# Patient Record
Sex: MALE | Race: WHITE | NOT HISPANIC OR LATINO | Employment: FULL TIME | URBAN - NONMETROPOLITAN AREA
[De-identification: names, ages, dates, MRNs, and addresses within clinical notes are randomized per-mention and may not be internally consistent; named-entity substitution may affect disease eponyms.]

---

## 2018-07-23 ENCOUNTER — OFFICE VISIT (OUTPATIENT)
Dept: URGENT CARE | Facility: PHYSICIAN GROUP | Age: 63
End: 2018-07-23
Payer: COMMERCIAL

## 2018-07-23 VITALS
HEART RATE: 74 BPM | SYSTOLIC BLOOD PRESSURE: 104 MMHG | WEIGHT: 182 LBS | RESPIRATION RATE: 16 BRPM | OXYGEN SATURATION: 99 % | TEMPERATURE: 98 F | DIASTOLIC BLOOD PRESSURE: 70 MMHG

## 2018-07-23 DIAGNOSIS — R10.813 RIGHT LOWER QUADRANT ABDOMINAL TENDERNESS, REBOUND TENDERNESS PRESENCE NOT SPECIFIED: ICD-10-CM

## 2018-07-23 DIAGNOSIS — R10.31 RLQ ABDOMINAL PAIN: ICD-10-CM

## 2018-07-23 PROCEDURE — 99203 OFFICE O/P NEW LOW 30 MIN: CPT | Performed by: EMERGENCY MEDICINE

## 2018-07-23 ASSESSMENT — ENCOUNTER SYMPTOMS
SHORTNESS OF BREATH: 0
VOMITING: 0
ANOREXIA: 1
FEVER: 1
HEMATOCHEZIA: 0
HEARTBURN: 0
COUGH: 0
BELCHING: 1
ABDOMINAL PAIN: 1
DIAPHORESIS: 0
NAUSEA: 0
CONSTIPATION: 1
BLOOD IN STOOL: 0
DIARRHEA: 1
CHILLS: 0

## 2018-07-23 NOTE — PROGRESS NOTES
Subjective:      Preston Hampton is a 63 y.o. male who presents with Abdominal Pain (x7d RLQ pain)            Abdominal Pain   This is a new problem. Episode onset: 8 days. The onset quality is sudden. The problem occurs daily. The problem has been gradually worsening. The pain is located in the RLQ. The quality of the pain is aching. The abdominal pain does not radiate. Associated symptoms include anorexia, belching, constipation, diarrhea and a fever. Pertinent negatives include no dysuria, frequency, hematochezia, hematuria, melena, nausea or vomiting. The pain is aggravated by movement, eating and certain positions. The pain is relieved by being still. He has tried antacids for the symptoms. The treatment provided no relief. There is no history of abdominal surgery or irritable bowel syndrome.   No trauma.    Review of Systems   Constitutional: Positive for fever. Negative for chills and diaphoresis.   Respiratory: Negative for cough and shortness of breath.    Cardiovascular: Negative for chest pain.   Gastrointestinal: Positive for abdominal pain, anorexia, constipation and diarrhea. Negative for blood in stool, heartburn, hematochezia, melena, nausea and vomiting.   Genitourinary: Negative for dysuria, frequency, hematuria and urgency.   Skin: Negative for rash.     PMH:  has no past medical history on file.  MEDS:   Current Outpatient Prescriptions:   •  levofloxacin (LEVAQUIN) 500 MG tablet, Take 1 Tab by mouth every day., Disp: 10 Tab, Rfl: 0  ALLERGIES:   Allergies   Allergen Reactions   • Pcn [Penicillins]      SURGHX: History reviewed. No pertinent surgical history.  SOCHX:  reports that he has never smoked. He has never used smokeless tobacco. He reports that he drinks alcohol.  FH: family history is not on file.       Objective:     /70   Pulse 74   Temp 36.7 °C (98 °F)   Resp 16   Wt 82.6 kg (182 lb)   SpO2 99%      Physical Exam   Constitutional: He is oriented to person, place, and  time. He appears well-developed and well-nourished. He is cooperative.  Non-toxic appearance. He does not have a sickly appearance. No distress.   HENT:   Mouth/Throat: Mucous membranes are normal.   Eyes: Conjunctivae and lids are normal. No scleral icterus.   Neck: Phonation normal. No JVD present.   Cardiovascular: Normal rate, regular rhythm and normal heart sounds.    No murmur heard.  Pulmonary/Chest: Effort normal and breath sounds normal.   Abdominal: Soft. He exhibits mass. He exhibits no distension. Bowel sounds are decreased. There is tenderness in the right lower quadrant. There is rebound and guarding. There is no rigidity and no CVA tenderness. No hernia.   Genitourinary: Testes normal and penis normal.   Lymphadenopathy:        Right: No inguinal adenopathy present.        Left: No inguinal adenopathy present.   Neurological: He is alert and oriented to person, place, and time.   Skin: Skin is warm and dry. No rash noted.   Psychiatric: He has a normal mood and affect.          Advised patient of need for ED evaluation and management; patient agreed. Patient appears stable enough transport by private vehicle. [facility] EDP provided telephone report.     Assessment/Plan:     1. Right lower quadrant abdominal tenderness, rebound tenderness presence not specified  NPO    2. RLQ abdominal pain

## 2021-09-28 ENCOUNTER — HOSPITAL ENCOUNTER (EMERGENCY)
Facility: MEDICAL CENTER | Age: 66
End: 2021-09-28
Attending: EMERGENCY MEDICINE
Payer: COMMERCIAL

## 2021-09-28 VITALS
TEMPERATURE: 98 F | RESPIRATION RATE: 17 BRPM | OXYGEN SATURATION: 98 % | HEIGHT: 72 IN | SYSTOLIC BLOOD PRESSURE: 130 MMHG | HEART RATE: 72 BPM | BODY MASS INDEX: 24.38 KG/M2 | DIASTOLIC BLOOD PRESSURE: 66 MMHG | WEIGHT: 180 LBS

## 2021-09-28 DIAGNOSIS — S16.1XXA STRAIN OF NECK MUSCLE, INITIAL ENCOUNTER: ICD-10-CM

## 2021-09-28 PROCEDURE — 99284 EMERGENCY DEPT VISIT MOD MDM: CPT
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
## 2021-09-28 NOTE — DISCHARGE INSTRUCTIONS
Follow-up with occupational health this week for reevaluation, medication management, physical therapy or additional imaging/MRI if symptoms persist.    Motrin 600 mg every 6 hours as needed for discomfort.    Slow stretching and range of motion exercises encouraged.  Activity as tolerated.    Return to the emergency department for headache, altered mental status, persistent or worsening neck pain or back pain, extremity weakness or paresthesias, chest pain, shortness of breath, abdominal pain or other new concerns.
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
